# Patient Record
Sex: FEMALE | Race: WHITE | URBAN - METROPOLITAN AREA
[De-identification: names, ages, dates, MRNs, and addresses within clinical notes are randomized per-mention and may not be internally consistent; named-entity substitution may affect disease eponyms.]

---

## 2018-05-12 PROBLEM — O09.523 SUPERVISION OF ELDERLY MULTIGRAVIDA IN THIRD TRIMESTER (>=35 YEARS OLD AT TIME OF DELIVERY): Status: ACTIVE | Noted: 2018-05-12

## 2018-05-12 PROBLEM — O34.211 MATERNAL CARE DUE TO LOW TRANSVERSE UTERINE SCAR FROM PREVIOUS CESAREAN DELIVERY: Status: ACTIVE | Noted: 2018-05-12

## 2018-05-12 PROBLEM — I10 INTERMITTENT HYPERTENSION: Status: ACTIVE | Noted: 2018-05-12

## 2018-05-12 PROBLEM — O09.893 SUPERVISION OF OTHER HIGH RISK PREGNANCIES, THIRD TRIMESTER: Status: ACTIVE | Noted: 2018-05-12

## 2018-05-12 PROBLEM — Z3A.36 36 WEEKS GESTATION OF PREGNANCY: Status: ACTIVE | Noted: 2018-05-12

## 2018-05-23 PROBLEM — Z3A.37 37 WEEKS GESTATION OF PREGNANCY: Status: ACTIVE | Noted: 2018-05-12

## 2021-11-08 ENCOUNTER — TELEPHONE (OUTPATIENT)
Dept: FAMILY MEDICINE CLINIC | Age: 41
End: 2021-11-08

## 2021-11-08 NOTE — TELEPHONE ENCOUNTER
Called pt  Lmom to call back to schedule new pt fertility appt. Okay per Dr. Yenni Gutierrez for pt to have a new pt fertility virtual visit if it is okay with pt.

## 2021-11-23 ENCOUNTER — VIRTUAL VISIT (OUTPATIENT)
Dept: FAMILY MEDICINE CLINIC | Age: 41
End: 2021-11-23
Payer: COMMERCIAL

## 2021-11-23 DIAGNOSIS — N91.4 SECONDARY OLIGOMENORRHEA: Primary | ICD-10-CM

## 2021-11-23 PROCEDURE — 99204 OFFICE O/P NEW MOD 45 MIN: CPT | Performed by: FAMILY MEDICINE

## 2021-11-23 ASSESSMENT — PATIENT HEALTH QUESTIONNAIRE - PHQ9
2. FEELING DOWN, DEPRESSED OR HOPELESS: 0
SUM OF ALL RESPONSES TO PHQ9 QUESTIONS 1 & 2: 0
SUM OF ALL RESPONSES TO PHQ QUESTIONS 1-9: 0
1. LITTLE INTEREST OR PLEASURE IN DOING THINGS: 0

## 2021-11-23 NOTE — PROGRESS NOTES
11/23/2021    This is a 42 y.o. female   Chief Complaint   Patient presents with    New Patient      for fertility     HPI    Virtual visit to discuss fertility  - three children - Oldest son Misha took a couple of years to conceive   - second child conceived without any significant issues  - second and third child Jayson Leos) are spaced by 4 years. - During that time, she recalls prolonged cycles and intermittent spotting.     - has had care in the past from accupuncture and a chiropractor    Family history non-contributory    She does note some intermittent cramping, hair loss, acne flare ups over the past year or so    Has never had issues with dsymenorrhea    In 2020 (last year) she had a few short cycles that were about 18 to 21 days. Since then over the past 18 months she has only had one period. She and her  would be potentially interested in having another child, but are wondering if early menopause is occurring.   -Outside labs were performed notable for normal androgens, normal thyroid function, elevated FSH    Review of Systems     Current Outpatient Medications   Medication Sig Dispense Refill    Prenatal MV-Min-Fe Fum-FA-DHA (PRENATAL 1 PO) Take 1 tablet by mouth (Patient not taking: Reported on 11/23/2021)       No current facility-administered medications for this visit. There were no vitals taken for this visit. Physical Exam    Wt Readings from Last 3 Encounters:   05/23/18 165 lb (74.8 kg)     BP Readings from Last 3 Encounters:   05/23/18 (!) 143/73   05/12/18 123/63     Assessment/Plan:  1. Secondary oligomenorrhea    Carmen Santos has signs and symptoms consistent with possible early menopause. However, we did discuss some labs to rule out less likely secondary causes (hyperprolactinemia). Although a clinical diagnosis, a repeat of [address] and estradiol would be helpful as well as an AMH. I reviewed her labs and history especially over the past year.   They were notable for an elevated FSH, normal thyroid function, normal androgens. I think it would be reasonable to use progesterone to see if this induces a withdrawal bleed for her. At that time, we could time her [facility] and estradiol labs were we could interpret them in the context of her cycle. -If no period occurs, we would still check random labs as above. We will follow-up with results    Karsten Nunez, was evaluated through a synchronous (real-time) audio-video encounter. The patient (or guardian if applicable) is aware that this is a billable service. Verbal consent to proceed has been obtained within the past 12 months. The visit was conducted pursuant to the emergency declaration under the [address] [place] [address] authority and the [employer] and [employer] General Act. Patient identification was verified, and a caregiver was present when appropriate. The patient was located in a state where the provider was credentialed to provide care. Total time spent for this encounter: 45 minutes    --Nidhi Gerardo MD on 11/24/2021 at 10:04 AM    An electronic signature was used to authenticate this note.

## 2021-11-24 RX ORDER — PROGESTERONE 200 MG/1
CAPSULE ORAL
Qty: 10 CAPSULE | Refills: 0 | Status: SHIPPED | OUTPATIENT
Start: 2021-11-24

## 2021-12-19 ENCOUNTER — PATIENT MESSAGE (OUTPATIENT)
Dept: FAMILY MEDICINE CLINIC | Age: 41
End: 2021-12-19

## 2021-12-20 NOTE — TELEPHONE ENCOUNTER
From: Demetrio Lopez  To: Dr. Chanel Neely Day  Sent: 12/19/2021 1:57 PM EST  Subject: Blood work order    Hello! I took the progesterone for 10 days and had no change. I would like to get the bloodwork done that we talked about. I see estrogen, FSH, DHEA and AMH in my upcoming tests and procedures, but I'm not sure how to access the order. Please let me know how / where the order can be sent. Thanks!      Sean Yuan

## 2021-12-21 ENCOUNTER — TELEPHONE (OUTPATIENT)
Dept: ENT CLINIC | Age: 41
End: 2021-12-21

## 2022-01-03 DIAGNOSIS — N91.4 SECONDARY OLIGOMENORRHEA: ICD-10-CM

## 2022-01-20 DIAGNOSIS — N91.4 SECONDARY OLIGOMENORRHEA: ICD-10-CM
